# Patient Record
Sex: FEMALE | Race: WHITE | Employment: FULL TIME | URBAN - METROPOLITAN AREA
[De-identification: names, ages, dates, MRNs, and addresses within clinical notes are randomized per-mention and may not be internally consistent; named-entity substitution may affect disease eponyms.]

---

## 2017-05-24 PROBLEM — G25.81 RLS (RESTLESS LEGS SYNDROME): Status: ACTIVE | Noted: 2017-05-24

## 2017-06-15 PROBLEM — F41.9 MILD ANXIETY: Status: ACTIVE | Noted: 2017-06-15

## 2017-06-15 PROBLEM — J30.9 ALLERGIC RHINITIS, UNSPECIFIED ALLERGIC RHINITIS TRIGGER, UNSPECIFIED RHINITIS SEASONALITY: Status: ACTIVE | Noted: 2017-06-15

## 2017-06-15 PROCEDURE — 82746 ASSAY OF FOLIC ACID SERUM: CPT | Performed by: FAMILY MEDICINE

## 2017-06-15 PROCEDURE — 81001 URINALYSIS AUTO W/SCOPE: CPT | Performed by: FAMILY MEDICINE

## 2017-06-15 PROCEDURE — 82607 VITAMIN B-12: CPT | Performed by: FAMILY MEDICINE

## 2017-09-06 ENCOUNTER — HOSPITAL ENCOUNTER (EMERGENCY)
Age: 37
Discharge: HOME | End: 2017-09-06
Attending: EMERGENCY MEDICINE
Payer: COMMERCIAL

## 2017-09-06 VITALS
TEMPERATURE: 99 F | HEART RATE: 68 BPM | SYSTOLIC BLOOD PRESSURE: 116 MMHG | OXYGEN SATURATION: 99 % | RESPIRATION RATE: 16 BRPM | DIASTOLIC BLOOD PRESSURE: 67 MMHG | WEIGHT: 132 LBS | BODY MASS INDEX: 23 KG/M2

## 2017-09-06 DIAGNOSIS — G43.809 OTHER MIGRAINE WITHOUT STATUS MIGRAINOSUS, NOT INTRACTABLE: Primary | ICD-10-CM

## 2017-09-06 PROCEDURE — 96361 HYDRATE IV INFUSION ADD-ON: CPT

## 2017-09-06 PROCEDURE — 99284 EMERGENCY DEPT VISIT MOD MDM: CPT

## 2017-09-06 PROCEDURE — 96375 TX/PRO/DX INJ NEW DRUG ADDON: CPT

## 2017-09-06 PROCEDURE — 96374 THER/PROPH/DIAG INJ IV PUSH: CPT

## 2017-09-06 RX ORDER — DIPHENHYDRAMINE HYDROCHLORIDE 50 MG/ML
25 INJECTION INTRAMUSCULAR; INTRAVENOUS ONCE
Status: COMPLETED | OUTPATIENT
Start: 2017-09-06 | End: 2017-09-06

## 2017-09-06 RX ORDER — SODIUM CHLORIDE 9 MG/ML
INJECTION, SOLUTION INTRAVENOUS ONCE
Status: COMPLETED | OUTPATIENT
Start: 2017-09-06 | End: 2017-09-06

## 2017-09-06 RX ORDER — ONDANSETRON 4 MG/1
4 TABLET, ORALLY DISINTEGRATING ORAL EVERY 4 HOURS PRN
Qty: 10 TABLET | Refills: 0 | Status: SHIPPED | OUTPATIENT
Start: 2017-09-06 | End: 2017-09-13

## 2017-09-06 RX ORDER — METOCLOPRAMIDE HYDROCHLORIDE 5 MG/ML
10 INJECTION INTRAMUSCULAR; INTRAVENOUS ONCE
Status: COMPLETED | OUTPATIENT
Start: 2017-09-06 | End: 2017-09-06

## 2017-09-06 RX ORDER — LORAZEPAM 2 MG/ML
0.5 INJECTION INTRAMUSCULAR ONCE
Status: COMPLETED | OUTPATIENT
Start: 2017-09-06 | End: 2017-09-06

## 2017-09-06 NOTE — ED PROVIDER NOTES
Patient Seen in: [facility] In [place]    History   Patient presents with:  Headache (neurologic)    Stated Complaint: headache    HPI    This is a 43year-old female presents with her typical migraine headache.   She states this curre 20  Temp: 98.9 °F (37.2 °C)  Temp src: Oral  SpO2: 99 %  O2 Device: None (Room air)    Current:/67   Pulse 68   Temp 98.9 °F (37.2 °C) (Oral)   Resp 16   Wt 59.9 kg   LMP 09/04/2017   SpO2 99%   BMI 23.38 kg/m²         Physical Exam    GENERAL: Awake taking these medications    ondansetron 4 MG Oral Tablet Dispersible  Take 1 tablet (4 mg total) by mouth every 4 (four) hours as needed for Nausea., Script printed, Disp-10 tablet, R-0

## 2018-04-29 ENCOUNTER — CHARTING TRANS (OUTPATIENT)
Dept: OTHER | Age: 38
End: 2018-04-29

## 2018-04-29 ASSESSMENT — PAIN SCALES - GENERAL: PAINLEVEL_OUTOF10: 5

## 2018-06-12 ENCOUNTER — APPOINTMENT (OUTPATIENT)
Dept: GENERAL RADIOLOGY | Age: 38
End: 2018-06-12
Attending: EMERGENCY MEDICINE
Payer: OTHER MISCELLANEOUS

## 2018-06-12 ENCOUNTER — HOSPITAL ENCOUNTER (EMERGENCY)
Age: 38
Discharge: HOME | End: 2018-06-12
Attending: EMERGENCY MEDICINE
Payer: OTHER MISCELLANEOUS

## 2018-06-12 VITALS
BODY MASS INDEX: 22.02 KG/M2 | TEMPERATURE: 99 F | RESPIRATION RATE: 18 BRPM | WEIGHT: 129 LBS | HEIGHT: 64 IN | OXYGEN SATURATION: 95 % | SYSTOLIC BLOOD PRESSURE: 122 MMHG | DIASTOLIC BLOOD PRESSURE: 62 MMHG | HEART RATE: 97 BPM

## 2018-06-12 DIAGNOSIS — S90.31XD CONTUSION OF RIGHT FOOT, SUBSEQUENT ENCOUNTER: Primary | ICD-10-CM

## 2018-06-12 PROCEDURE — 99283 EMERGENCY DEPT VISIT LOW MDM: CPT

## 2018-06-12 PROCEDURE — 73630 X-RAY EXAM OF FOOT: CPT | Performed by: EMERGENCY MEDICINE

## 2018-06-12 NOTE — ED PROVIDER NOTES
Patient Seen in: [facility] Emergency Department In [place]    History   Patient presents with:  Lower Extremity Injury (musculoskeletal)    Stated Complaint: right foot pain    HPI    Patient is a 75-year-old female who works as a  state °F (37.1 °C) (Temporal)   Resp 18   Ht 162.6 cm (5' 4\")   Wt 58.5 kg   SpO2 95%   BMI 22.14 kg/m²         Physical Exam  GENERAL: Well-developed, well-nourished female sitting up breathing easily in no apparent distress.   Patient is nontoxic in appearance diagnosis)    Disposition:  Discharge  6/12/2018  5:24 pm    Follow-up:  Antione Cotton MD  [address] [phone]    Call in 2 days  please call for follow up with this orthopedic md    [facility]

## 2018-06-13 PROBLEM — S90.121A: Status: ACTIVE | Noted: 2018-06-13

## 2018-08-17 ENCOUNTER — HOSPITAL ENCOUNTER (EMERGENCY)
Age: 38
Discharge: HOME | End: 2018-08-17
Attending: EMERGENCY MEDICINE
Payer: COMMERCIAL

## 2018-08-17 VITALS
RESPIRATION RATE: 16 BRPM | HEART RATE: 80 BPM | BODY MASS INDEX: 22.2 KG/M2 | WEIGHT: 130 LBS | DIASTOLIC BLOOD PRESSURE: 58 MMHG | OXYGEN SATURATION: 100 % | SYSTOLIC BLOOD PRESSURE: 92 MMHG | HEIGHT: 64 IN | TEMPERATURE: 98 F

## 2018-08-17 DIAGNOSIS — G43.809 OTHER MIGRAINE WITHOUT STATUS MIGRAINOSUS, NOT INTRACTABLE: Primary | ICD-10-CM

## 2018-08-17 PROCEDURE — 99284 EMERGENCY DEPT VISIT MOD MDM: CPT

## 2018-08-17 PROCEDURE — 96375 TX/PRO/DX INJ NEW DRUG ADDON: CPT

## 2018-08-17 PROCEDURE — 96361 HYDRATE IV INFUSION ADD-ON: CPT

## 2018-08-17 PROCEDURE — 96374 THER/PROPH/DIAG INJ IV PUSH: CPT

## 2018-08-17 RX ORDER — METOCLOPRAMIDE HYDROCHLORIDE 5 MG/ML
10 INJECTION INTRAMUSCULAR; INTRAVENOUS ONCE
Status: COMPLETED | OUTPATIENT
Start: 2018-08-17 | End: 2018-08-17

## 2018-08-17 RX ORDER — DIPHENHYDRAMINE HYDROCHLORIDE 50 MG/ML
50 INJECTION INTRAMUSCULAR; INTRAVENOUS ONCE
Status: COMPLETED | OUTPATIENT
Start: 2018-08-17 | End: 2018-08-17

## 2018-08-17 RX ORDER — LORAZEPAM 2 MG/ML
1 INJECTION INTRAMUSCULAR ONCE
Status: COMPLETED | OUTPATIENT
Start: 2018-08-17 | End: 2018-08-17

## 2018-08-17 RX ORDER — KETOROLAC TROMETHAMINE 30 MG/ML
30 INJECTION, SOLUTION INTRAMUSCULAR; INTRAVENOUS ONCE
Status: COMPLETED | OUTPATIENT
Start: 2018-08-17 | End: 2018-08-17

## 2018-08-17 NOTE — ED PROVIDER NOTES
Patient Seen in: [facility] Emergency Department In [place]    History   Patient presents with:  Headache (neurologic)    Stated Complaint: migraine    HPI    38-year-old female with a history of migraine headaches presents to the emergency department com swapna lalen. Vital signs were reviewed per nurse's notes. HEENT: Normocephalic atraumatic. Anicteric sclera. Pupils equal round reactive to light. Nose and oropharynx are normal.  Oral mucosa is moist.  Neck: No adenopathy or thyromegaly.   No meningeal s

## 2018-08-17 NOTE — ED INITIAL ASSESSMENT (HPI)
States \"permanent headache\" but migraine started at 0430. Tried norco and fioricet at home.  Reports +vomiting/nausea

## 2018-10-09 ENCOUNTER — HOSPITAL ENCOUNTER (EMERGENCY)
Age: 38
Discharge: HOME | End: 2018-10-09
Attending: EMERGENCY MEDICINE
Payer: COMMERCIAL

## 2018-10-09 VITALS
TEMPERATURE: 98 F | HEART RATE: 88 BPM | WEIGHT: 130 LBS | HEIGHT: 64 IN | SYSTOLIC BLOOD PRESSURE: 93 MMHG | RESPIRATION RATE: 16 BRPM | DIASTOLIC BLOOD PRESSURE: 47 MMHG | OXYGEN SATURATION: 100 % | BODY MASS INDEX: 22.2 KG/M2

## 2018-10-09 DIAGNOSIS — G43.809 OTHER MIGRAINE WITHOUT STATUS MIGRAINOSUS, NOT INTRACTABLE: Primary | ICD-10-CM

## 2018-10-09 PROCEDURE — 96365 THER/PROPH/DIAG IV INF INIT: CPT | Performed by: EMERGENCY MEDICINE

## 2018-10-09 PROCEDURE — 99284 EMERGENCY DEPT VISIT MOD MDM: CPT | Performed by: EMERGENCY MEDICINE

## 2018-10-09 PROCEDURE — 96361 HYDRATE IV INFUSION ADD-ON: CPT | Performed by: EMERGENCY MEDICINE

## 2018-10-09 PROCEDURE — 96375 TX/PRO/DX INJ NEW DRUG ADDON: CPT | Performed by: EMERGENCY MEDICINE

## 2018-10-09 RX ORDER — KETOROLAC TROMETHAMINE 30 MG/ML
15 INJECTION, SOLUTION INTRAMUSCULAR; INTRAVENOUS ONCE
Status: COMPLETED | OUTPATIENT
Start: 2018-10-09 | End: 2018-10-09

## 2018-10-09 RX ORDER — SUMATRIPTAN 6 MG/.5ML
6 INJECTION, SOLUTION SUBCUTANEOUS ONCE
Status: DISCONTINUED | OUTPATIENT
Start: 2018-10-09 | End: 2018-10-09

## 2018-10-09 RX ORDER — DIPHENHYDRAMINE HYDROCHLORIDE 50 MG/ML
25 INJECTION INTRAMUSCULAR; INTRAVENOUS ONCE
Status: COMPLETED | OUTPATIENT
Start: 2018-10-09 | End: 2018-10-09

## 2018-10-09 RX ORDER — METOCLOPRAMIDE HYDROCHLORIDE 5 MG/ML
10 INJECTION INTRAMUSCULAR; INTRAVENOUS ONCE
Status: COMPLETED | OUTPATIENT
Start: 2018-10-09 | End: 2018-10-09

## 2018-10-09 RX ORDER — DEXAMETHASONE SODIUM PHOSPHATE 4 MG/ML
10 VIAL (ML) INJECTION ONCE
Status: COMPLETED | OUTPATIENT
Start: 2018-10-09 | End: 2018-10-09

## 2018-10-09 NOTE — ED INITIAL ASSESSMENT (HPI)
Pt states migraine headache all weekend, nausea and vomiting, states feels like typical migraine seth

## 2018-10-09 NOTE — ED PROVIDER NOTES
Patient Seen in: [facility] In [place]    History   Patient presents with:  Headache (neurologic)    Stated Complaint: headache    HPI    Patient is a 49-year-old female comes emergency room for evaluation of a migraine headache.   Katie Chiu 10/09/18 1003 106/52   Pulse 10/09/18 1003 84   Resp 10/09/18 1003 18   Temp 10/09/18 1003 97.7 °F (36.5 °C)   Temp src --    SpO2 10/09/18 1003 98 %   O2 Device 10/09/18 1124 None (Room air)       Current:BP 93/47   Pulse 88   Temp 97.7 °F (36.5 °C)   Res Intravenous Given 10/9/18 1025)     Patient reexamined with improvement in symptoms and complete resolution of headache back to her baseline headache      MDM   42-year-old female with acute cephalgia. Symptoms consistent with migraine headache.   I've con

## 2018-11-01 VITALS
BODY MASS INDEX: 23.35 KG/M2 | TEMPERATURE: 98.4 F | RESPIRATION RATE: 16 BRPM | HEIGHT: 64 IN | WEIGHT: 136.8 LBS | HEART RATE: 84 BPM

## 2019-02-01 ENCOUNTER — HOSPITAL (OUTPATIENT)
Dept: OTHER | Age: 39
End: 2019-02-01

## 2019-09-08 ENCOUNTER — HOSPITAL ENCOUNTER (EMERGENCY)
Age: 39
Discharge: HOME | End: 2019-09-08
Attending: EMERGENCY MEDICINE
Payer: COMMERCIAL

## 2019-09-08 VITALS
WEIGHT: 136 LBS | TEMPERATURE: 98 F | DIASTOLIC BLOOD PRESSURE: 60 MMHG | OXYGEN SATURATION: 99 % | RESPIRATION RATE: 16 BRPM | BODY MASS INDEX: 23.22 KG/M2 | HEART RATE: 65 BPM | HEIGHT: 64 IN | SYSTOLIC BLOOD PRESSURE: 105 MMHG

## 2019-09-08 DIAGNOSIS — G43.809 OTHER MIGRAINE WITHOUT STATUS MIGRAINOSUS, NOT INTRACTABLE: Primary | ICD-10-CM

## 2019-09-08 PROCEDURE — 99284 EMERGENCY DEPT VISIT MOD MDM: CPT

## 2019-09-08 PROCEDURE — 96365 THER/PROPH/DIAG IV INF INIT: CPT

## 2019-09-08 PROCEDURE — 96375 TX/PRO/DX INJ NEW DRUG ADDON: CPT

## 2019-09-08 RX ORDER — DEXAMETHASONE SODIUM PHOSPHATE 4 MG/ML
10 VIAL (ML) INJECTION ONCE
Status: COMPLETED | OUTPATIENT
Start: 2019-09-08 | End: 2019-09-08

## 2019-09-08 RX ORDER — DIPHENHYDRAMINE HYDROCHLORIDE 50 MG/ML
25 INJECTION INTRAMUSCULAR; INTRAVENOUS ONCE
Status: COMPLETED | OUTPATIENT
Start: 2019-09-08 | End: 2019-09-08

## 2019-09-08 RX ORDER — KETOROLAC TROMETHAMINE 30 MG/ML
30 INJECTION, SOLUTION INTRAMUSCULAR; INTRAVENOUS ONCE
Status: COMPLETED | OUTPATIENT
Start: 2019-09-08 | End: 2019-09-08

## 2019-09-08 RX ORDER — METOCLOPRAMIDE HYDROCHLORIDE 5 MG/ML
10 INJECTION INTRAMUSCULAR; INTRAVENOUS ONCE
Status: COMPLETED | OUTPATIENT
Start: 2019-09-08 | End: 2019-09-08

## 2019-09-09 NOTE — ED PROVIDER NOTES
Patient Seen in: THE [facility] Emergency Department In [place]    History   Patient presents with:  Headache (neurologic)    Stated Complaint: migraine    HPI    Patient with long history of migraine headaches complains of several days of constant throbbing Clear  Neuro: Alert and oriented. Speech clear. No facial asymmetry. Moving all 4 extremities normally.        ED Course   Labs Reviewed - No data to display  Patient was given intravenous Toradol, Benadryl, Reglan, Decadron, Depakote after which she fel

## 2021-01-18 ENCOUNTER — APPOINTMENT (OUTPATIENT)
Dept: CT IMAGING | Age: 41
End: 2021-01-18
Attending: EMERGENCY MEDICINE
Payer: COMMERCIAL

## 2021-01-18 ENCOUNTER — TELEPHONE (OUTPATIENT)
Dept: SCHEDULING | Age: 41
End: 2021-01-18

## 2021-01-18 ENCOUNTER — HOSPITAL ENCOUNTER (EMERGENCY)
Age: 41
Discharge: HOME | End: 2021-01-18
Attending: EMERGENCY MEDICINE
Payer: COMMERCIAL

## 2021-01-18 ENCOUNTER — WALK IN (OUTPATIENT)
Dept: URGENT CARE | Age: 41
End: 2021-01-18

## 2021-01-18 VITALS
DIASTOLIC BLOOD PRESSURE: 70 MMHG | RESPIRATION RATE: 16 BRPM | HEART RATE: 88 BPM | OXYGEN SATURATION: 100 % | WEIGHT: 142.09 LBS | TEMPERATURE: 98 F | HEIGHT: 64 IN | BODY MASS INDEX: 24.26 KG/M2 | SYSTOLIC BLOOD PRESSURE: 116 MMHG

## 2021-01-18 VITALS
WEIGHT: 140 LBS | DIASTOLIC BLOOD PRESSURE: 65 MMHG | RESPIRATION RATE: 18 BRPM | HEART RATE: 86 BPM | SYSTOLIC BLOOD PRESSURE: 117 MMHG | BODY MASS INDEX: 23.9 KG/M2 | OXYGEN SATURATION: 97 % | HEIGHT: 64 IN | TEMPERATURE: 99 F

## 2021-01-18 DIAGNOSIS — G44.311 INTRACTABLE ACUTE POST-TRAUMATIC HEADACHE: Primary | ICD-10-CM

## 2021-01-18 DIAGNOSIS — H92.02 EARACHE ON LEFT: ICD-10-CM

## 2021-01-18 DIAGNOSIS — S06.0X0A CONCUSSION WITHOUT LOSS OF CONSCIOUSNESS, INITIAL ENCOUNTER: Primary | ICD-10-CM

## 2021-01-18 PROCEDURE — 99204 OFFICE O/P NEW MOD 45 MIN: CPT | Performed by: NURSE PRACTITIONER

## 2021-01-18 PROCEDURE — 70450 CT HEAD/BRAIN W/O DYE: CPT | Performed by: EMERGENCY MEDICINE

## 2021-01-18 PROCEDURE — 99284 EMERGENCY DEPT VISIT MOD MDM: CPT

## 2021-01-18 RX ORDER — METOCLOPRAMIDE 10 MG/1
TABLET ORAL
COMMUNITY
Start: 2020-08-05

## 2021-01-18 RX ORDER — ALPRAZOLAM 0.5 MG/1
TABLET ORAL
COMMUNITY
Start: 2021-01-16

## 2021-01-18 RX ORDER — EPINEPHRINE 0.3 MG/.3ML
INJECTION SUBCUTANEOUS
COMMUNITY
Start: 2020-12-28

## 2021-01-18 RX ORDER — VALACYCLOVIR HYDROCHLORIDE 500 MG/1
500 TABLET, FILM COATED ORAL EVERY 12 HOURS
COMMUNITY
Start: 2020-12-21

## 2021-01-18 RX ORDER — ZOLPIDEM TARTRATE 10 MG/1
10 TABLET ORAL
COMMUNITY
Start: 2021-01-18

## 2021-01-18 RX ORDER — DOXEPIN HYDROCHLORIDE 50 MG/1
1 CAPSULE ORAL
COMMUNITY

## 2021-01-18 RX ORDER — CYCLOBENZAPRINE HCL 10 MG
10 TABLET ORAL
COMMUNITY
Start: 2021-01-18

## 2021-01-18 RX ORDER — ALUMINUM CHLORIDE 20 %
SOLUTION, NON-ORAL TOPICAL
COMMUNITY
Start: 2020-12-29

## 2021-01-18 RX ORDER — BUTALBITAL, ACETAMINOPHEN, CAFFEINE AND CODEINE PHOSPHATE 50; 325; 40; 30 MG/1; MG/1; MG/1; MG/1
1 CAPSULE ORAL
COMMUNITY
Start: 2021-01-18

## 2021-01-18 ASSESSMENT — ENCOUNTER SYMPTOMS
VOMITING: 0
CHEST TIGHTNESS: 0
FATIGUE: 0
SINUS PRESSURE: 0
EYE REDNESS: 0
COUGH: 0
FEVER: 0
SEIZURES: 0
EYE DISCHARGE: 0
HALLUCINATIONS: 0
EYE PAIN: 0
WEAKNESS: 0
APPETITE CHANGE: 0
CONSTIPATION: 0
ACTIVITY CHANGE: 0
DIZZINESS: 0
DIARRHEA: 0
CONFUSION: 0
NAUSEA: 0
CHILLS: 0
SORE THROAT: 0
SPEECH DIFFICULTY: 0
NERVOUS/ANXIOUS: 0
EYE ITCHING: 0
SLEEP DISTURBANCE: 0
SHORTNESS OF BREATH: 0
RHINORRHEA: 0
VOICE CHANGE: 0
ABDOMINAL PAIN: 0
LIGHT-HEADEDNESS: 0
SINUS PAIN: 0
HEADACHES: 1
TROUBLE SWALLOWING: 0
PHOTOPHOBIA: 0
WHEEZING: 0

## 2021-01-18 NOTE — ED INITIAL ASSESSMENT (HPI)
REPORTS IN THE BEGINNING OF JAN SHE FELL AND HIT THE LEFT SIDE OF HER HEAD. SINCE THEN WITH LEFT HEAD AND EAR PAIN. REPORTS UNABLE TO LAY ON LEFT SIDE.   CAME FROM [facility] AND NO EAR INFECTION NOTED

## 2021-01-18 NOTE — ED PROVIDER NOTES
Patient Seen in: [facility] In [place]      History   Patient presents with:  Ear Problem Pain    Stated Complaint: left ear pain    HPI/Subjective:   HPI    78-year-old woman with history of chronic headaches who said that she sustai Exam    General:  Vitals as listed. No acute distress   HEENT: Sclerae anicteric. Conjunctivae show no pallor. Oropharynx clear, mucous membranes moist.  EACs are clear with no evidence of blood staining on the left.   TMs normal.  Head: No evidence of t normal.  Her persistent headaches may be the result of a concussion. She will continue to take her Fioricet as regularly prescribed for her migraines.   She will also use ibuprofen    I have discussed with the patient the results of test, differential diag

## 2021-06-13 ENCOUNTER — APPOINTMENT (OUTPATIENT)
Dept: GENERAL RADIOLOGY | Age: 41
End: 2021-06-13
Attending: PHYSICIAN ASSISTANT
Payer: COMMERCIAL

## 2021-06-13 ENCOUNTER — HOSPITAL ENCOUNTER (EMERGENCY)
Age: 41
Discharge: HOME | End: 2021-06-13
Attending: EMERGENCY MEDICINE
Payer: COMMERCIAL

## 2021-06-13 VITALS
WEIGHT: 140 LBS | TEMPERATURE: 99 F | OXYGEN SATURATION: 99 % | DIASTOLIC BLOOD PRESSURE: 98 MMHG | HEART RATE: 100 BPM | SYSTOLIC BLOOD PRESSURE: 115 MMHG | BODY MASS INDEX: 24 KG/M2 | RESPIRATION RATE: 16 BRPM

## 2021-06-13 DIAGNOSIS — S89.91XA INJURY OF RIGHT KNEE, INITIAL ENCOUNTER: Primary | ICD-10-CM

## 2021-06-13 PROCEDURE — 99283 EMERGENCY DEPT VISIT LOW MDM: CPT

## 2021-06-13 PROCEDURE — 99284 EMERGENCY DEPT VISIT MOD MDM: CPT

## 2021-06-13 PROCEDURE — 73560 X-RAY EXAM OF KNEE 1 OR 2: CPT | Performed by: EMERGENCY MEDICINE

## 2021-06-13 RX ORDER — NAPROXEN 500 MG/1
500 TABLET ORAL 2 TIMES DAILY PRN
Qty: 20 TABLET | Refills: 0 | Status: SHIPPED | OUTPATIENT
Start: 2021-06-13 | End: 2021-06-20

## 2021-06-14 NOTE — ED PROVIDER NOTES
Patient Seen in: [facility] Emergency Department In [place]      History   Patient presents with:  Leg or Foot Injury    Stated Complaint: right knee injury yesterday    HPI/Subjective:   HPI    CHIEF COMPLAINT: Right knee injury     HISTORY OF PRESENT [place] Social History    Tobacco Use      Smoking status: Never Smoker      Smokeless tobacco: Never Used    Vaping Use      Vaping Use: Never used    Alcohol use: Yes      Comment: occasional    Drug use:  No             Review of Systems    Positive for st significant arthropathy. SOFT TISSUES:  No visible soft tissue swelling. EFFUSION:  None visible. CONCLUSION:  1. Unremarkable right knee radiographs.   MRI of the knee can be performed for further evaluation of internal derangement as clinically

## 2022-09-01 ENCOUNTER — APPOINTMENT (OUTPATIENT)
Dept: URBAN - METROPOLITAN AREA CLINIC 247 | Age: 42
Setting detail: DERMATOLOGY
End: 2022-09-06

## 2022-09-01 DIAGNOSIS — D49.2 NEOPLASM OF UNSPECIFIED BEHAVIOR OF BONE, SOFT TISSUE, AND SKIN: ICD-10-CM

## 2022-09-01 DIAGNOSIS — D22 MELANOCYTIC NEVI: ICD-10-CM

## 2022-09-01 DIAGNOSIS — L73.8 OTHER SPECIFIED FOLLICULAR DISORDERS: ICD-10-CM

## 2022-09-01 PROBLEM — D22.71 MELANOCYTIC NEVI OF RIGHT LOWER LIMB, INCLUDING HIP: Status: ACTIVE | Noted: 2022-09-01

## 2022-09-01 PROCEDURE — OTHER PRESCRIPTION: OTHER

## 2022-09-01 PROCEDURE — OTHER COUNSELING: OTHER

## 2022-09-01 PROCEDURE — OTHER MEDICATION COUNSELING: OTHER

## 2022-09-01 PROCEDURE — OTHER SHAVE REMOVAL: OTHER

## 2022-09-01 PROCEDURE — OTHER PRESCRIPTION MEDICATION MANAGEMENT: OTHER

## 2022-09-01 PROCEDURE — 11300 SHAVE SKIN LESION 0.5 CM/<: CPT

## 2022-09-01 PROCEDURE — OTHER DEFER: OTHER

## 2022-09-01 PROCEDURE — 99213 OFFICE O/P EST LOW 20 MIN: CPT | Mod: 25

## 2022-09-01 PROCEDURE — A4550 SURGICAL TRAYS: HCPCS

## 2022-09-01 RX ORDER — TRETIONIN 0.25 MG/G
CREAM TOPICAL
Qty: 20 | Refills: 11 | Status: ERX | COMMUNITY
Start: 2022-09-01

## 2022-09-01 ASSESSMENT — LOCATION DETAILED DESCRIPTION DERM
LOCATION DETAILED: RIGHT POPLITEAL SKIN
LOCATION DETAILED: RIGHT CENTRAL ZYGOMA
LOCATION DETAILED: RIGHT KNEE

## 2022-09-01 ASSESSMENT — LOCATION ZONE DERM
LOCATION ZONE: FACE
LOCATION ZONE: LEG

## 2022-09-01 ASSESSMENT — LOCATION SIMPLE DESCRIPTION DERM
LOCATION SIMPLE: RIGHT POPLITEAL SKIN
LOCATION SIMPLE: RIGHT ZYGOMA
LOCATION SIMPLE: RIGHT KNEE

## 2022-09-01 NOTE — PROCEDURE: MEDICATION COUNSELING
Xelreemaz Pregnancy And Lactation Text: This medication is Pregnancy Category D and is not considered safe during pregnancy.  The risk during breast feeding is also uncertain.

## 2022-09-01 NOTE — PROCEDURE: DEFER
Size Of Lesion In Cm (Optional): 0
Introduction Text (Please End With A Colon): .
Introduction Text (Please End With A Colon): .
Detail Level: Zone

## 2022-09-01 NOTE — PROCEDURE: MEDICATION COUNSELING
Ambulatory Prednisone Pregnancy And Lactation Text: This medication is Pregnancy Category C and it isn't know if it is safe during pregnancy. This medication is excreted in breast milk.

## 2022-09-01 NOTE — PROCEDURE: PRESCRIPTION MEDICATION MANAGEMENT
Initiate Treatment: Tretinoin 0.025% cream nightly.
Render In Strict Bullet Format?: No
Detail Level: Zone

## 2022-09-01 NOTE — PROCEDURE: SHAVE REMOVAL
X Size Of Lesion In Cm (Optional): 0
Detail Level: Detailed
Biopsy Method: Personna blade
Wound Care: Petrolatum
Render Post-Care Instructions In Note?: yes
Post-Care Instructions: I reviewed with the patient in detail post-care instructions. Patient is to keep the biopsy site dry overnight, and then apply bacitracin twice daily until healed. Patient may apply hydrogen peroxide soaks to remove any crusting.
Add Variable For Additional Medical Justification: No
Medical Necessity Clause: This procedure was medically necessary because the lesion that was treated was:
Anesthesia Type: 1% lidocaine with epinephrine
Size Of Lesion In Cm (Required): 0.5
Consent was obtained from the patient. The risks and benefits to therapy were discussed in detail. Specifically, the risks of infection, scarring, bleeding, prolonged wound healing, incomplete removal, allergy to anesthesia, nerve injury and recurrence were addressed. Prior to the procedure, the treatment site was clearly identified and confirmed by the patient. All components of Universal Protocol/PAUSE Rule completed.
Path Notes (To The Dermatopathologist): Check margins
Hemostasis: Drysol
Medical Necessity Information: It is in your best interest to select a reason for this procedure from the list below. All of these items fulfill various CMS LCD requirements except the new and changing color options.
Billing Type: Third-Party Bill
Notification Instructions: Patient will be notified of pathology results. However, patient instructed to call the office if not contacted within 2 weeks.
Body Location Override (Optional - Billing Will Still Be Based On Selected Body Map Location If Applicable): right popliteal fossa

## 2022-09-29 ENCOUNTER — RX ONLY (RX ONLY)
Age: 42
End: 2022-09-29

## 2022-09-29 RX ORDER — TRETIONIN 0.25 MG/G
CREAM TOPICAL
Qty: 20 | Refills: 5 | Status: ERX

## 2022-11-17 ENCOUNTER — APPOINTMENT (OUTPATIENT)
Dept: CT IMAGING | Age: 42
End: 2022-11-17
Attending: EMERGENCY MEDICINE
Payer: COMMERCIAL

## 2022-11-17 ENCOUNTER — HOSPITAL ENCOUNTER (EMERGENCY)
Age: 42
Discharge: HOME | End: 2022-11-17
Attending: EMERGENCY MEDICINE
Payer: COMMERCIAL

## 2022-11-17 VITALS
HEIGHT: 64 IN | RESPIRATION RATE: 16 BRPM | SYSTOLIC BLOOD PRESSURE: 125 MMHG | DIASTOLIC BLOOD PRESSURE: 74 MMHG | TEMPERATURE: 98 F | HEART RATE: 87 BPM | BODY MASS INDEX: 23.9 KG/M2 | WEIGHT: 140 LBS | OXYGEN SATURATION: 100 %

## 2022-11-17 DIAGNOSIS — R10.31 ABDOMINAL PAIN, RIGHT LOWER QUADRANT: Primary | ICD-10-CM

## 2022-11-17 LAB
ALBUMIN SERPL-MCNC: 4.1 G/DL (ref 3.4–5)
ALBUMIN/GLOB SERPL: 1.2 {RATIO} (ref 1–2)
ALP LIVER SERPL-CCNC: 74 U/L
ALT SERPL-CCNC: 41 U/L
ANION GAP SERPL CALC-SCNC: 7 MMOL/L (ref 0–18)
AST SERPL-CCNC: 19 U/L (ref 15–37)
B-HCG UR QL: NEGATIVE
BASOPHILS # BLD AUTO: 0.03 X10(3) UL (ref 0–0.2)
BASOPHILS NFR BLD AUTO: 0.4 %
BILIRUB SERPL-MCNC: 0.5 MG/DL (ref 0.1–2)
BILIRUB UR QL STRIP.AUTO: NEGATIVE
BUN BLD-MCNC: 10 MG/DL (ref 7–18)
CALCIUM BLD-MCNC: 8.7 MG/DL (ref 8.5–10.1)
CHLORIDE SERPL-SCNC: 107 MMOL/L (ref 98–112)
CLARITY UR REFRACT.AUTO: CLEAR
CO2 SERPL-SCNC: 24 MMOL/L (ref 21–32)
COLOR UR AUTO: YELLOW
CREAT BLD-MCNC: 0.82 MG/DL
EOSINOPHIL # BLD AUTO: 0.02 X10(3) UL (ref 0–0.7)
EOSINOPHIL NFR BLD AUTO: 0.3 %
ERYTHROCYTE [DISTWIDTH] IN BLOOD BY AUTOMATED COUNT: 12.9 %
GFR SERPLBLD BASED ON 1.73 SQ M-ARVRAT: 90 ML/MIN/1.73M2 (ref 60–?)
GLOBULIN PLAS-MCNC: 3.4 G/DL (ref 2.8–4.4)
GLUCOSE BLD-MCNC: 89 MG/DL (ref 70–99)
GLUCOSE UR STRIP.AUTO-MCNC: NEGATIVE MG/DL
HCT VFR BLD AUTO: 41.5 %
HGB BLD-MCNC: 14.3 G/DL
IMM GRANULOCYTES # BLD AUTO: 0.02 X10(3) UL (ref 0–1)
IMM GRANULOCYTES NFR BLD: 0.3 %
KETONES UR STRIP.AUTO-MCNC: 40 MG/DL
LEUKOCYTE ESTERASE UR QL STRIP.AUTO: NEGATIVE
LIPASE SERPL-CCNC: 80 U/L (ref 73–393)
LYMPHOCYTES # BLD AUTO: 1.22 X10(3) UL (ref 1–4)
LYMPHOCYTES NFR BLD AUTO: 17 %
MCH RBC QN AUTO: 32.5 PG (ref 26–34)
MCHC RBC AUTO-ENTMCNC: 34.5 G/DL (ref 31–37)
MCV RBC AUTO: 94.3 FL
MONOCYTES # BLD AUTO: 0.51 X10(3) UL (ref 0.1–1)
MONOCYTES NFR BLD AUTO: 7.1 %
NEUTROPHILS # BLD AUTO: 5.37 X10 (3) UL (ref 1.5–7.7)
NEUTROPHILS # BLD AUTO: 5.37 X10(3) UL (ref 1.5–7.7)
NEUTROPHILS NFR BLD AUTO: 74.9 %
NITRITE UR QL STRIP.AUTO: NEGATIVE
OSMOLALITY SERPL CALC.SUM OF ELEC: 285 MOSM/KG (ref 275–295)
PH UR STRIP.AUTO: 7.5 [PH] (ref 5–8)
PLATELET # BLD AUTO: 188 10(3)UL (ref 150–450)
POTASSIUM SERPL-SCNC: 3.8 MMOL/L (ref 3.5–5.1)
PROT SERPL-MCNC: 7.5 G/DL (ref 6.4–8.2)
PROT UR STRIP.AUTO-MCNC: NEGATIVE MG/DL
RBC # BLD AUTO: 4.4 X10(6)UL
RBC UR QL AUTO: NEGATIVE
SODIUM SERPL-SCNC: 138 MMOL/L (ref 136–145)
SP GR UR STRIP.AUTO: 1.02 (ref 1–1.03)
UROBILINOGEN UR STRIP.AUTO-MCNC: 0.2 MG/DL
WBC # BLD AUTO: 7.2 X10(3) UL (ref 4–11)

## 2022-11-17 PROCEDURE — 36415 COLL VENOUS BLD VENIPUNCTURE: CPT

## 2022-11-17 PROCEDURE — 81003 URINALYSIS AUTO W/O SCOPE: CPT | Performed by: EMERGENCY MEDICINE

## 2022-11-17 PROCEDURE — 80053 COMPREHEN METABOLIC PANEL: CPT | Performed by: EMERGENCY MEDICINE

## 2022-11-17 PROCEDURE — 74177 CT ABD & PELVIS W/CONTRAST: CPT | Performed by: EMERGENCY MEDICINE

## 2022-11-17 PROCEDURE — 83690 ASSAY OF LIPASE: CPT | Performed by: EMERGENCY MEDICINE

## 2022-11-17 PROCEDURE — 81003 URINALYSIS AUTO W/O SCOPE: CPT

## 2022-11-17 PROCEDURE — 81025 URINE PREGNANCY TEST: CPT

## 2022-11-17 PROCEDURE — 99284 EMERGENCY DEPT VISIT MOD MDM: CPT

## 2022-11-17 PROCEDURE — 85025 COMPLETE CBC W/AUTO DIFF WBC: CPT | Performed by: EMERGENCY MEDICINE

## 2022-11-17 NOTE — DISCHARGE INSTRUCTIONS
Push fluids  Light diet  Tylenol or Motrin for pain  A mild laxative like fresh prunes and prune juice or MiraLAX or milk of magnesia may help to facilitate bowel movements to see if this alleviates her discomfort    Return if worse

## 2023-09-29 ENCOUNTER — HOSPITAL ENCOUNTER (EMERGENCY)
Age: 43
Discharge: HOME | End: 2023-09-29
Attending: STUDENT IN AN ORGANIZED HEALTH CARE EDUCATION/TRAINING PROGRAM
Payer: COMMERCIAL

## 2023-09-29 VITALS
HEART RATE: 82 BPM | RESPIRATION RATE: 16 BRPM | DIASTOLIC BLOOD PRESSURE: 71 MMHG | WEIGHT: 140 LBS | HEIGHT: 64 IN | SYSTOLIC BLOOD PRESSURE: 111 MMHG | BODY MASS INDEX: 23.9 KG/M2 | TEMPERATURE: 98 F | OXYGEN SATURATION: 98 %
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
## 2023-09-29 PROCEDURE — 99283 EMERGENCY DEPT VISIT LOW MDM: CPT

## 2023-09-29 PROCEDURE — 64450 NJX AA&/STRD OTHER PN/BRANCH: CPT

## 2023-09-29 PROCEDURE — 90471 IMMUNIZATION ADMIN: CPT

## 2024-12-12 ENCOUNTER — APPOINTMENT (OUTPATIENT)
Dept: URBAN - METROPOLITAN AREA CLINIC 247 | Age: 44
Setting detail: DERMATOLOGY
End: 2024-12-12

## 2024-12-12 DIAGNOSIS — L57.8 OTHER SKIN CHANGES DUE TO CHRONIC EXPOSURE TO NONIONIZING RADIATION: ICD-10-CM

## 2024-12-12 DIAGNOSIS — L57.0 ACTINIC KERATOSIS: ICD-10-CM

## 2024-12-12 PROBLEM — D23.39 OTHER BENIGN NEOPLASM OF SKIN OF OTHER PARTS OF FACE: Status: ACTIVE | Noted: 2024-12-12

## 2024-12-12 PROCEDURE — 17110 DESTRUCT B9 LESION 1-14: CPT

## 2024-12-12 PROCEDURE — OTHER COUNSELING: OTHER

## 2024-12-12 PROCEDURE — 17000 DESTRUCT PREMALG LESION: CPT | Mod: 59

## 2024-12-12 PROCEDURE — OTHER LIQUID NITROGEN: OTHER

## 2024-12-12 PROCEDURE — 99213 OFFICE O/P EST LOW 20 MIN: CPT | Mod: 25

## 2024-12-12 PROCEDURE — OTHER BENIGN DESTRUCTION: OTHER

## 2024-12-12 ASSESSMENT — LOCATION DETAILED DESCRIPTION DERM
LOCATION DETAILED: RIGHT FOREHEAD
LOCATION DETAILED: RIGHT MEDIAL FOREHEAD

## 2024-12-12 ASSESSMENT — LOCATION SIMPLE DESCRIPTION DERM: LOCATION SIMPLE: RIGHT FOREHEAD

## 2024-12-12 ASSESSMENT — LOCATION ZONE DERM: LOCATION ZONE: FACE

## 2024-12-12 NOTE — PROCEDURE: LIQUID NITROGEN
Show Applicator Variable?: Yes
Render Note In Bullet Format When Appropriate: No
Post-Care Instructions: I reviewed with the patient in detail post-care instructions. Patient is to wear sunprotection, and avoid picking at any of the treated lesions. Pt may apply Vaseline to crusted or scabbing areas.
Consent: The patient's consent was obtained including but not limited to risks of crusting, scabbing, blistering, scarring, darker or lighter pigmentary change, recurrence, incomplete removal and infection.
Detail Level: Detailed
Number Of Freeze-Thaw Cycles: 2 freeze-thaw cycles
Duration Of Freeze Thaw-Cycle (Seconds): 0

## 2025-02-13 ENCOUNTER — APPOINTMENT (OUTPATIENT)
Dept: URBAN - METROPOLITAN AREA CLINIC 247 | Age: 45
Setting detail: DERMATOLOGY
End: 2025-02-13

## 2025-02-13 DIAGNOSIS — L57.3 POIKILODERMA OF CIVATTE: ICD-10-CM

## 2025-02-13 DIAGNOSIS — L81.0 POSTINFLAMMATORY HYPERPIGMENTATION: ICD-10-CM

## 2025-02-13 DIAGNOSIS — L57.8 OTHER SKIN CHANGES DUE TO CHRONIC EXPOSURE TO NONIONIZING RADIATION: ICD-10-CM

## 2025-02-13 PROCEDURE — 99213 OFFICE O/P EST LOW 20 MIN: CPT

## 2025-02-13 PROCEDURE — OTHER DEFER: OTHER

## 2025-02-13 PROCEDURE — OTHER COUNSELING: OTHER

## 2025-02-13 ASSESSMENT — LOCATION ZONE DERM
LOCATION ZONE: FACE
LOCATION ZONE: TRUNK
LOCATION ZONE: NOSE

## 2025-02-13 ASSESSMENT — LOCATION SIMPLE DESCRIPTION DERM
LOCATION SIMPLE: RIGHT FOREHEAD
LOCATION SIMPLE: CHEST
LOCATION SIMPLE: LEFT NOSE

## 2025-02-13 ASSESSMENT — LOCATION DETAILED DESCRIPTION DERM
LOCATION DETAILED: LEFT NASAL SIDEWALL
LOCATION DETAILED: UPPER STERNUM
LOCATION DETAILED: RIGHT MEDIAL FOREHEAD

## 2025-02-13 NOTE — PROCEDURE: DEFER
Introduction Text (Please End With A Colon): The following procedure was deferred:
X Size Of Lesion In Cm (Optional): 0
Detail Level: Detailed
Reason To Defer Override: patient advised of laser treatment

## (undated) NOTE — ED AVS SNAPSHOT
Hugo Lindo   MRN: [id number]    Department:  [facility] in [place]   Date of Visit:  9/8/2019           Disclosure     Insurance plans vary and the physician(s) referred by the ER may not be covered by your plan.  Please contact tell this physician (or your personal doctor if your instructions are to return to your personal doctor) about any new or lasting problems. The primary care or specialist physician will see patients referred from the [facility] Emergency Department.  Nacho Saleh

## (undated) NOTE — ED AVS SNAPSHOT
Link Shepherd   MRN: [id number]    Department:  THE [facility] Emergency Department in [place]   Date of Visit:  6/12/2018           Disclosure     Insurance plans vary and the physician(s) referred by the ER may not be covered by your plan.  Please contact tell this physician (or your personal doctor if your instructions are to return to your personal doctor) about any new or lasting problems. The primary care or specialist physician will see patients referred from the [facility] Emergency Department.  Rah Hernández

## (undated) NOTE — ED AVS SNAPSHOT
Lona Downey   MRN: [id number]    Department:  THE [facility] Emergency Department in [place]   Date of Visit:  9/6/2017           Disclosure     Insurance plans vary and the physician(s) referred by the ER may not be covered by your plan.  Please contact If you have been prescribed any medication(s), please fill your prescription right away and begin taking the medication(s) as directed    If the emergency physician has read X-rays, these will be re-interpreted by a radiologist.  If there is a significant

## (undated) NOTE — ED AVS SNAPSHOT
Abbe Interiano   MRN: [id number]    Department:  [facility] in [place]   Date of Visit:  10/9/2018           Disclosure     Insurance plans vary and the physician(s) referred by the ER may not be covered by your plan.  Please contact tell this physician (or your personal doctor if your instructions are to return to your personal doctor) about any new or lasting problems. The primary care or specialist physician will see patients referred from the [facility] Emergency Department.  Jailene Fajardo